# Patient Record
Sex: MALE | Race: WHITE | Employment: OTHER | URBAN - NONMETROPOLITAN AREA
[De-identification: names, ages, dates, MRNs, and addresses within clinical notes are randomized per-mention and may not be internally consistent; named-entity substitution may affect disease eponyms.]

---

## 2017-11-22 ENCOUNTER — HOSPITAL ENCOUNTER (INPATIENT)
Age: 72
LOS: 2 days | Discharge: HOME | DRG: 439 | End: 2017-11-24
Attending: HOSPITALIST | Admitting: INTERNAL MEDICINE
Payer: COMMERCIAL

## 2017-11-22 PROBLEM — F17.200 SMOKER: Status: ACTIVE | Noted: 2017-11-22

## 2017-11-22 PROBLEM — I73.9 PVD (PERIPHERAL VASCULAR DISEASE) (HCC): Status: ACTIVE | Noted: 2017-11-22

## 2017-11-22 PROBLEM — I71.40 ABDOMINAL AORTIC ANEURYSM (AAA): Status: ACTIVE | Noted: 2017-11-22

## 2017-11-22 PROBLEM — K85.90 PANCREATITIS: Status: ACTIVE | Noted: 2017-11-22

## 2017-11-22 PROBLEM — I82.890 SPLENIC VEIN THROMBOSIS: Status: ACTIVE | Noted: 2017-11-22

## 2017-11-22 LAB
ALBUMIN SERPL-MCNC: 2.7 G/DL (ref 3.5–5.2)
ALP BLD-CCNC: 96 U/L (ref 40–130)
ALT SERPL-CCNC: 10 U/L (ref 5–41)
AMYLASE: 138 U/L (ref 28–100)
ANION GAP SERPL CALCULATED.3IONS-SCNC: 8 MMOL/L (ref 7–19)
AST SERPL-CCNC: 18 U/L (ref 5–40)
BASOPHILS ABSOLUTE: 0.1 K/UL (ref 0–0.2)
BASOPHILS RELATIVE PERCENT: 0.6 % (ref 0–1)
BILIRUB SERPL-MCNC: 0.9 MG/DL (ref 0.2–1.2)
BUN BLDV-MCNC: 8 MG/DL (ref 8–23)
CALCIUM SERPL-MCNC: 8.4 MG/DL (ref 8.8–10.2)
CHLORIDE BLD-SCNC: 99 MMOL/L (ref 98–111)
CO2: 29 MMOL/L (ref 22–29)
CREAT SERPL-MCNC: 0.6 MG/DL (ref 0.5–1.2)
EOSINOPHILS ABSOLUTE: 0.7 K/UL (ref 0–0.6)
EOSINOPHILS RELATIVE PERCENT: 8.2 % (ref 0–5)
GFR NON-AFRICAN AMERICAN: >60
GLUCOSE BLD-MCNC: 123 MG/DL (ref 74–109)
HCT VFR BLD CALC: 39.2 % (ref 42–52)
HEMOGLOBIN: 13.5 G/DL (ref 14–18)
LIPASE: 178 U/L (ref 13–60)
LYMPHOCYTES ABSOLUTE: 1.1 K/UL (ref 1.1–4.5)
LYMPHOCYTES RELATIVE PERCENT: 13.5 % (ref 20–40)
MCH RBC QN AUTO: 31.5 PG (ref 27–31)
MCHC RBC AUTO-ENTMCNC: 34.4 G/DL (ref 33–37)
MCV RBC AUTO: 91.6 FL (ref 80–94)
MONOCYTES ABSOLUTE: 0.8 K/UL (ref 0–0.9)
MONOCYTES RELATIVE PERCENT: 9.3 % (ref 0–10)
NEUTROPHILS ABSOLUTE: 5.6 K/UL (ref 1.5–7.5)
NEUTROPHILS RELATIVE PERCENT: 68 % (ref 50–65)
PDW BLD-RTO: 15.7 % (ref 11.5–14.5)
PLATELET # BLD: 203 K/UL (ref 130–400)
PMV BLD AUTO: 9.6 FL (ref 9.4–12.4)
POTASSIUM SERPL-SCNC: 3.2 MMOL/L (ref 3.5–5)
PRO-BNP: 265 PG/ML (ref 0–900)
RBC # BLD: 4.28 M/UL (ref 4.7–6.1)
SODIUM BLD-SCNC: 136 MMOL/L (ref 136–145)
TOTAL PROTEIN: 5.8 G/DL (ref 6.6–8.7)
WBC # BLD: 8.2 K/UL (ref 4.8–10.8)

## 2017-11-22 PROCEDURE — 88305 TISSUE EXAM BY PATHOLOGIST: CPT

## 2017-11-22 PROCEDURE — 85025 COMPLETE CBC W/AUTO DIFF WBC: CPT

## 2017-11-22 PROCEDURE — 6360000002 HC RX W HCPCS: Performed by: INTERNAL MEDICINE

## 2017-11-22 PROCEDURE — 88173 CYTOPATH EVAL FNA REPORT: CPT

## 2017-11-22 PROCEDURE — 2580000003 HC RX 258: Performed by: HOSPITALIST

## 2017-11-22 PROCEDURE — 36415 COLL VENOUS BLD VENIPUNCTURE: CPT

## 2017-11-22 PROCEDURE — 83690 ASSAY OF LIPASE: CPT

## 2017-11-22 PROCEDURE — 6370000000 HC RX 637 (ALT 250 FOR IP): Performed by: INTERNAL MEDICINE

## 2017-11-22 PROCEDURE — 2700000000 HC OXYGEN THERAPY PER DAY

## 2017-11-22 PROCEDURE — 2580000003 HC RX 258: Performed by: INTERNAL MEDICINE

## 2017-11-22 PROCEDURE — 83880 ASSAY OF NATRIURETIC PEPTIDE: CPT

## 2017-11-22 PROCEDURE — 1210000000 HC MED SURG R&B

## 2017-11-22 PROCEDURE — 82150 ASSAY OF AMYLASE: CPT

## 2017-11-22 PROCEDURE — 99223 1ST HOSP IP/OBS HIGH 75: CPT | Performed by: INTERNAL MEDICINE

## 2017-11-22 PROCEDURE — 80053 COMPREHEN METABOLIC PANEL: CPT

## 2017-11-22 RX ORDER — METOPROLOL SUCCINATE 50 MG/1
50 TABLET, EXTENDED RELEASE ORAL DAILY
COMMUNITY

## 2017-11-22 RX ORDER — NICOTINE 21 MG/24HR
1 PATCH, TRANSDERMAL 24 HOURS TRANSDERMAL DAILY
Status: DISCONTINUED | OUTPATIENT
Start: 2017-11-23 | End: 2017-11-24 | Stop reason: HOSPADM

## 2017-11-22 RX ORDER — AMLODIPINE BESYLATE 10 MG/1
10 TABLET ORAL DAILY
Status: ON HOLD | COMMUNITY
End: 2017-11-24 | Stop reason: HOSPADM

## 2017-11-22 RX ORDER — CLOPIDOGREL BISULFATE 75 MG/1
75 TABLET ORAL DAILY
Status: DISCONTINUED | OUTPATIENT
Start: 2017-11-22 | End: 2017-11-24 | Stop reason: HOSPADM

## 2017-11-22 RX ORDER — MELOXICAM 15 MG/1
15 TABLET ORAL DAILY
COMMUNITY

## 2017-11-22 RX ORDER — SODIUM CHLORIDE 0.9 % (FLUSH) 0.9 %
10 SYRINGE (ML) INJECTION PRN
Status: DISCONTINUED | OUTPATIENT
Start: 2017-11-22 | End: 2017-11-24 | Stop reason: HOSPADM

## 2017-11-22 RX ORDER — PANTOPRAZOLE SODIUM 40 MG/1
40 TABLET, DELAYED RELEASE ORAL DAILY
COMMUNITY

## 2017-11-22 RX ORDER — ONDANSETRON 2 MG/ML
4 INJECTION INTRAMUSCULAR; INTRAVENOUS EVERY 6 HOURS PRN
Status: DISCONTINUED | OUTPATIENT
Start: 2017-11-22 | End: 2017-11-24 | Stop reason: HOSPADM

## 2017-11-22 RX ORDER — SODIUM CHLORIDE 0.9 % (FLUSH) 0.9 %
10 SYRINGE (ML) INJECTION EVERY 12 HOURS SCHEDULED
Status: DISCONTINUED | OUTPATIENT
Start: 2017-11-22 | End: 2017-11-24 | Stop reason: HOSPADM

## 2017-11-22 RX ORDER — LISINOPRIL 40 MG/1
40 TABLET ORAL DAILY
Status: ON HOLD | COMMUNITY
End: 2017-11-24 | Stop reason: HOSPADM

## 2017-11-22 RX ORDER — CLOPIDOGREL BISULFATE 75 MG/1
75 TABLET ORAL DAILY
COMMUNITY

## 2017-11-22 RX ORDER — FENTANYL CITRATE 50 UG/ML
25 INJECTION, SOLUTION INTRAMUSCULAR; INTRAVENOUS EVERY 4 HOURS PRN
Status: DISCONTINUED | OUTPATIENT
Start: 2017-11-22 | End: 2017-11-24 | Stop reason: HOSPADM

## 2017-11-22 RX ORDER — ACETAMINOPHEN 325 MG/1
650 TABLET ORAL EVERY 4 HOURS PRN
Status: DISCONTINUED | OUTPATIENT
Start: 2017-11-22 | End: 2017-11-24 | Stop reason: HOSPADM

## 2017-11-22 RX ORDER — POTASSIUM CHLORIDE 7.45 MG/ML
10 INJECTION INTRAVENOUS
Status: COMPLETED | OUTPATIENT
Start: 2017-11-22 | End: 2017-11-23

## 2017-11-22 RX ORDER — POTASSIUM CHLORIDE 750 MG/1
10 TABLET, FILM COATED, EXTENDED RELEASE ORAL DAILY
COMMUNITY

## 2017-11-22 RX ORDER — BISACODYL 10 MG
10 SUPPOSITORY, RECTAL RECTAL DAILY PRN
Status: DISCONTINUED | OUTPATIENT
Start: 2017-11-22 | End: 2017-11-24 | Stop reason: HOSPADM

## 2017-11-22 RX ORDER — TAMSULOSIN HYDROCHLORIDE 0.4 MG/1
0.4 CAPSULE ORAL 2 TIMES DAILY
COMMUNITY

## 2017-11-22 RX ORDER — LORAZEPAM 1 MG/1
1 TABLET ORAL 3 TIMES DAILY PRN
Status: ON HOLD | COMMUNITY
End: 2017-12-08 | Stop reason: ALTCHOICE

## 2017-11-22 RX ORDER — LORAZEPAM 1 MG/1
1 TABLET ORAL 3 TIMES DAILY PRN
Status: DISCONTINUED | OUTPATIENT
Start: 2017-11-22 | End: 2017-11-24 | Stop reason: HOSPADM

## 2017-11-22 RX ORDER — TAMSULOSIN HYDROCHLORIDE 0.4 MG/1
0.4 CAPSULE ORAL 2 TIMES DAILY
Status: DISCONTINUED | OUTPATIENT
Start: 2017-11-22 | End: 2017-11-24 | Stop reason: HOSPADM

## 2017-11-22 RX ORDER — SODIUM CHLORIDE 9 MG/ML
INJECTION, SOLUTION INTRAVENOUS CONTINUOUS
Status: DISCONTINUED | OUTPATIENT
Start: 2017-11-22 | End: 2017-11-24

## 2017-11-22 RX ORDER — DOCUSATE SODIUM 100 MG/1
100 CAPSULE, LIQUID FILLED ORAL 2 TIMES DAILY
Status: DISCONTINUED | OUTPATIENT
Start: 2017-11-22 | End: 2017-11-24 | Stop reason: HOSPADM

## 2017-11-22 RX ADMIN — POTASSIUM CHLORIDE 10 MEQ: 10 INJECTION, SOLUTION INTRAVENOUS at 21:35

## 2017-11-22 RX ADMIN — TAMSULOSIN HYDROCHLORIDE 0.4 MG: 0.4 CAPSULE ORAL at 21:35

## 2017-11-22 RX ADMIN — DOCUSATE SODIUM 100 MG: 100 CAPSULE, LIQUID FILLED ORAL at 21:35

## 2017-11-22 RX ADMIN — SODIUM CHLORIDE: 9 INJECTION, SOLUTION INTRAVENOUS at 21:35

## 2017-11-22 RX ADMIN — FENTANYL CITRATE 25 MCG: 50 INJECTION, SOLUTION INTRAMUSCULAR; INTRAVENOUS at 21:35

## 2017-11-22 RX ADMIN — Medication 10 ML: at 21:36

## 2017-11-22 RX ADMIN — LORAZEPAM 1 MG: 1 TABLET ORAL at 21:35

## 2017-11-22 ASSESSMENT — ENCOUNTER SYMPTOMS
BLURRED VISION: 0
COUGH: 0
DOUBLE VISION: 0
SPUTUM PRODUCTION: 0
PHOTOPHOBIA: 0
CONSTIPATION: 0
DIARRHEA: 0
ABDOMINAL PAIN: 1
HEMOPTYSIS: 0
BACK PAIN: 1

## 2017-11-22 ASSESSMENT — PAIN SCALES - GENERAL
PAINLEVEL_OUTOF10: 2
PAINLEVEL_OUTOF10: 5

## 2017-11-23 LAB
AMYLASE: 246 U/L (ref 28–100)
ANION GAP SERPL CALCULATED.3IONS-SCNC: 10 MMOL/L (ref 7–19)
APTT: 34.9 SEC (ref 26–36.2)
BASOPHILS ABSOLUTE: 0.1 K/UL (ref 0–0.2)
BASOPHILS RELATIVE PERCENT: 0.9 % (ref 0–1)
BILIRUBIN URINE: NEGATIVE
BLOOD, URINE: NEGATIVE
BUN BLDV-MCNC: 9 MG/DL (ref 8–23)
CALCIUM SERPL-MCNC: 8 MG/DL (ref 8.8–10.2)
CHLORIDE BLD-SCNC: 101 MMOL/L (ref 98–111)
CLARITY: CLEAR
CO2: 28 MMOL/L (ref 22–29)
COLOR: ABNORMAL
CREAT SERPL-MCNC: 0.7 MG/DL (ref 0.5–1.2)
EOSINOPHILS ABSOLUTE: 0.7 K/UL (ref 0–0.6)
EOSINOPHILS RELATIVE PERCENT: 8.2 % (ref 0–5)
GFR NON-AFRICAN AMERICAN: >60
GLUCOSE BLD-MCNC: 91 MG/DL (ref 74–109)
GLUCOSE URINE: NEGATIVE MG/DL
HCT VFR BLD CALC: 35.8 % (ref 42–52)
HEMOGLOBIN: 12.1 G/DL (ref 14–18)
INR BLD: 1.2 (ref 0.88–1.18)
KETONES, URINE: NEGATIVE MG/DL
LEUKOCYTE ESTERASE, URINE: NEGATIVE
LIPASE: 949 U/L (ref 13–60)
LYMPHOCYTES ABSOLUTE: 1.3 K/UL (ref 1.1–4.5)
LYMPHOCYTES RELATIVE PERCENT: 14.9 % (ref 20–40)
MCH RBC QN AUTO: 31.1 PG (ref 27–31)
MCHC RBC AUTO-ENTMCNC: 33.8 G/DL (ref 33–37)
MCV RBC AUTO: 92 FL (ref 80–94)
MONOCYTES ABSOLUTE: 1.2 K/UL (ref 0–0.9)
MONOCYTES RELATIVE PERCENT: 13.4 % (ref 0–10)
NEUTROPHILS ABSOLUTE: 5.6 K/UL (ref 1.5–7.5)
NEUTROPHILS RELATIVE PERCENT: 62.2 % (ref 50–65)
NITRITE, URINE: NEGATIVE
PDW BLD-RTO: 15.9 % (ref 11.5–14.5)
PH UA: 7.5
PLATELET # BLD: 213 K/UL (ref 130–400)
PMV BLD AUTO: 9.9 FL (ref 9.4–12.4)
POTASSIUM SERPL-SCNC: 3.6 MMOL/L (ref 3.5–5)
PROTEIN UA: NEGATIVE MG/DL
PROTHROMBIN TIME: 15.2 SEC (ref 12–14.6)
RBC # BLD: 3.89 M/UL (ref 4.7–6.1)
SODIUM BLD-SCNC: 139 MMOL/L (ref 136–145)
SPECIFIC GRAVITY UA: 1.02
UROBILINOGEN, URINE: 4 E.U./DL
WBC # BLD: 9 K/UL (ref 4.8–10.8)

## 2017-11-23 PROCEDURE — 80048 BASIC METABOLIC PNL TOTAL CA: CPT

## 2017-11-23 PROCEDURE — 36415 COLL VENOUS BLD VENIPUNCTURE: CPT

## 2017-11-23 PROCEDURE — 85025 COMPLETE CBC W/AUTO DIFF WBC: CPT

## 2017-11-23 PROCEDURE — 2580000003 HC RX 258: Performed by: INTERNAL MEDICINE

## 2017-11-23 PROCEDURE — 6370000000 HC RX 637 (ALT 250 FOR IP): Performed by: INTERNAL MEDICINE

## 2017-11-23 PROCEDURE — 82150 ASSAY OF AMYLASE: CPT

## 2017-11-23 PROCEDURE — 6360000002 HC RX W HCPCS: Performed by: INTERNAL MEDICINE

## 2017-11-23 PROCEDURE — 1210000000 HC MED SURG R&B

## 2017-11-23 PROCEDURE — 85610 PROTHROMBIN TIME: CPT

## 2017-11-23 PROCEDURE — 6370000000 HC RX 637 (ALT 250 FOR IP): Performed by: HOSPITALIST

## 2017-11-23 PROCEDURE — 85730 THROMBOPLASTIN TIME PARTIAL: CPT

## 2017-11-23 PROCEDURE — 83690 ASSAY OF LIPASE: CPT

## 2017-11-23 PROCEDURE — 99222 1ST HOSP IP/OBS MODERATE 55: CPT | Performed by: INTERNAL MEDICINE

## 2017-11-23 PROCEDURE — 99232 SBSQ HOSP IP/OBS MODERATE 35: CPT | Performed by: HOSPITALIST

## 2017-11-23 PROCEDURE — 2500000003 HC RX 250 WO HCPCS: Performed by: INTERNAL MEDICINE

## 2017-11-23 PROCEDURE — 81003 URINALYSIS AUTO W/O SCOPE: CPT

## 2017-11-23 PROCEDURE — C9113 INJ PANTOPRAZOLE SODIUM, VIA: HCPCS | Performed by: INTERNAL MEDICINE

## 2017-11-23 RX ORDER — LORAZEPAM 1 MG/1
3 TABLET ORAL
Status: DISCONTINUED | OUTPATIENT
Start: 2017-11-23 | End: 2017-11-24 | Stop reason: HOSPADM

## 2017-11-23 RX ORDER — LORAZEPAM 1 MG/1
1 TABLET ORAL
Status: DISCONTINUED | OUTPATIENT
Start: 2017-11-23 | End: 2017-11-24 | Stop reason: HOSPADM

## 2017-11-23 RX ORDER — CHLORDIAZEPOXIDE HYDROCHLORIDE 10 MG/1
10 CAPSULE, GELATIN COATED ORAL 3 TIMES DAILY
Status: DISCONTINUED | OUTPATIENT
Start: 2017-11-23 | End: 2017-11-24 | Stop reason: HOSPADM

## 2017-11-23 RX ORDER — LORAZEPAM 1 MG/1
2 TABLET ORAL
Status: DISCONTINUED | OUTPATIENT
Start: 2017-11-23 | End: 2017-11-24 | Stop reason: HOSPADM

## 2017-11-23 RX ORDER — LORAZEPAM 2 MG/ML
1 INJECTION INTRAMUSCULAR
Status: DISCONTINUED | OUTPATIENT
Start: 2017-11-23 | End: 2017-11-24 | Stop reason: HOSPADM

## 2017-11-23 RX ORDER — SODIUM CHLORIDE 0.9 % (FLUSH) 0.9 %
10 SYRINGE (ML) INJECTION EVERY 12 HOURS SCHEDULED
Status: DISCONTINUED | OUTPATIENT
Start: 2017-11-23 | End: 2017-11-24 | Stop reason: HOSPADM

## 2017-11-23 RX ORDER — LORAZEPAM 2 MG/ML
2 INJECTION INTRAMUSCULAR
Status: DISCONTINUED | OUTPATIENT
Start: 2017-11-23 | End: 2017-11-24 | Stop reason: HOSPADM

## 2017-11-23 RX ORDER — LORAZEPAM 2 MG/ML
3 INJECTION INTRAMUSCULAR
Status: DISCONTINUED | OUTPATIENT
Start: 2017-11-23 | End: 2017-11-24 | Stop reason: HOSPADM

## 2017-11-23 RX ORDER — LORAZEPAM 1 MG/1
4 TABLET ORAL
Status: DISCONTINUED | OUTPATIENT
Start: 2017-11-23 | End: 2017-11-24 | Stop reason: HOSPADM

## 2017-11-23 RX ORDER — PANTOPRAZOLE SODIUM 40 MG/10ML
40 INJECTION, POWDER, LYOPHILIZED, FOR SOLUTION INTRAVENOUS DAILY
Status: DISCONTINUED | OUTPATIENT
Start: 2017-11-23 | End: 2017-11-24 | Stop reason: HOSPADM

## 2017-11-23 RX ORDER — SODIUM CHLORIDE 0.9 % (FLUSH) 0.9 %
10 SYRINGE (ML) INJECTION PRN
Status: DISCONTINUED | OUTPATIENT
Start: 2017-11-23 | End: 2017-11-24 | Stop reason: HOSPADM

## 2017-11-23 RX ORDER — LORAZEPAM 2 MG/ML
4 INJECTION INTRAMUSCULAR
Status: DISCONTINUED | OUTPATIENT
Start: 2017-11-23 | End: 2017-11-24 | Stop reason: HOSPADM

## 2017-11-23 RX ADMIN — Medication 10 ML: at 07:43

## 2017-11-23 RX ADMIN — Medication 10 ML: at 20:00

## 2017-11-23 RX ADMIN — ONDANSETRON 4 MG: 2 INJECTION INTRAMUSCULAR; INTRAVENOUS at 08:05

## 2017-11-23 RX ADMIN — ENOXAPARIN SODIUM 40 MG: 40 INJECTION SUBCUTANEOUS at 20:00

## 2017-11-23 RX ADMIN — FENTANYL CITRATE 25 MCG: 50 INJECTION, SOLUTION INTRAMUSCULAR; INTRAVENOUS at 21:45

## 2017-11-23 RX ADMIN — FENTANYL CITRATE 25 MCG: 50 INJECTION, SOLUTION INTRAMUSCULAR; INTRAVENOUS at 12:19

## 2017-11-23 RX ADMIN — CHLORDIAZEPOXIDE HYDROCHLORIDE 10 MG: 10 CAPSULE ORAL at 15:57

## 2017-11-23 RX ADMIN — TAMSULOSIN HYDROCHLORIDE 0.4 MG: 0.4 CAPSULE ORAL at 20:00

## 2017-11-23 RX ADMIN — CHLORDIAZEPOXIDE HYDROCHLORIDE 10 MG: 10 CAPSULE ORAL at 20:00

## 2017-11-23 RX ADMIN — TAMSULOSIN HYDROCHLORIDE 0.4 MG: 0.4 CAPSULE ORAL at 07:43

## 2017-11-23 RX ADMIN — FENTANYL CITRATE 25 MCG: 50 INJECTION, SOLUTION INTRAMUSCULAR; INTRAVENOUS at 08:05

## 2017-11-23 RX ADMIN — PANTOPRAZOLE SODIUM 40 MG: 40 INJECTION, POWDER, FOR SOLUTION INTRAVENOUS at 07:43

## 2017-11-23 RX ADMIN — DOCUSATE SODIUM 100 MG: 100 CAPSULE, LIQUID FILLED ORAL at 07:43

## 2017-11-23 RX ADMIN — POTASSIUM CHLORIDE 10 MEQ: 10 INJECTION, SOLUTION INTRAVENOUS at 00:35

## 2017-11-23 RX ADMIN — FENTANYL CITRATE 25 MCG: 50 INJECTION, SOLUTION INTRAMUSCULAR; INTRAVENOUS at 17:18

## 2017-11-23 RX ADMIN — DOCUSATE SODIUM 100 MG: 100 CAPSULE, LIQUID FILLED ORAL at 20:00

## 2017-11-23 RX ADMIN — FOLIC ACID: 5 INJECTION, SOLUTION INTRAMUSCULAR; INTRAVENOUS; SUBCUTANEOUS at 09:55

## 2017-11-23 ASSESSMENT — PAIN SCALES - GENERAL
PAINLEVEL_OUTOF10: 0
PAINLEVEL_OUTOF10: 6
PAINLEVEL_OUTOF10: 2
PAINLEVEL_OUTOF10: 3
PAINLEVEL_OUTOF10: 7
PAINLEVEL_OUTOF10: 6

## 2017-11-23 NOTE — H&P
Hospital Medicine    History & Physical        Reason for admission:  Transfer from [facility] for pancreatitis, pancreatic mass    History Obtained From:  patient, electronic medical record    HISTORY OF PRESENT ILLNESS:    The patient is a 67 y.o. male who was having epigastric discomfort he went to see his pcp, and labs were sent resulting in elevated amylase, and lipase. A ct was done and results were ? Mass, cbd, splenic vein thrombosis, stable aaa 3.4,thickening greater curvature of stomach . The case was discussed with Dr Governor Mora hospitalist and was accepted for transfer for possible ERCP and higher level of care. Past Medical History:        Diagnosis Date    Arthritis     Blood circulation, collateral     GERD (gastroesophageal reflux disease)     current epigastric pain     Hypertension        Past Surgical History:        Procedure Laterality Date    BACK SURGERY N/A        Medications Prior to Admission:    Prescriptions Prior to Admission: meloxicam (MOBIC) 15 MG tablet, Take 15 mg by mouth daily  tamsulosin (FLOMAX) 0.4 MG capsule, Take 0.4 mg by mouth 2 times daily  pantoprazole (PROTONIX) 40 MG tablet, Take 40 mg by mouth daily  LORazepam (ATIVAN) 1 MG tablet, Take 1 mg by mouth 3 times daily as needed for Anxiety . metoprolol succinate (TOPROL XL) 50 MG extended release tablet, Take 50 mg by mouth daily  potassium chloride (KLOR-CON) 10 MEQ extended release tablet, Take 10 mEq by mouth daily  lisinopril (PRINIVIL;ZESTRIL) 40 MG tablet, Take 40 mg by mouth daily  amLODIPine (NORVASC) 10 MG tablet, Take 10 mg by mouth daily  clopidogrel (PLAVIX) 75 MG tablet, Take 75 mg by mouth daily  aspirin 81 MG tablet, Take 81 mg by mouth daily    Allergies:  Review of patient's allergies indicates no known allergies. Social History:   TOBACCO:   has no tobacco history on file. ETOH:   has no alcohol history on file.   Patient currently lives with family     Family History:       Problem Relation Age of Onset    Heart Attack Mother     Heart Disease Mother     Cancer Brother        I have personally reviewed above histories  REVIEW OF SYSTEMS:  Review of Systems   Constitutional: Negative for chills, fever and weight loss. HENT: Negative. Negative for ear pain, hearing loss and tinnitus. Eyes: Negative for blurred vision, double vision and photophobia. Respiratory: Negative for cough, hemoptysis and sputum production. Cardiovascular: Negative. Gastrointestinal: Positive for abdominal pain. Negative for constipation and diarrhea. Genitourinary: Negative for dysuria, frequency and urgency. Musculoskeletal: Positive for back pain and joint pain. Negative for falls and myalgias. Skin: Negative. Neurological: Negative for dizziness, tingling, tremors, weakness and headaches. Endo/Heme/Allergies: Negative for environmental allergies. Psychiatric/Behavioral: Negative for depression, substance abuse and suicidal ideas. The patient has insomnia. PHYSICAL EXAM:  /63   Pulse 97   Temp 97.5 °F (36.4 °C) (Temporal)   Resp 18   SpO2 92%   Physical Exam   Constitutional: He is oriented to person, place, and time. He appears well-developed and well-nourished. Non-toxic appearance. He does not have a sickly appearance. He does not appear ill. No distress. HENT:   Head: Normocephalic and atraumatic. Eyes: Lids are normal. Pupils are equal, round, and reactive to light. Neck: Trachea normal. No JVD present. Carotid bruit is not present. Cardiovascular: Normal rate and regular rhythm. Pulses:       Carotid pulses are 2+ on the right side, and 2+ on the left side. Radial pulses are 2+ on the right side, and 2+ on the left side. Pulmonary/Chest: He has no decreased breath sounds. He has no wheezes. He has no rhonchi. He has no rales. Abdominal: Soft.  Normal appearance and bowel sounds are normal.   Musculoskeletal:        Right lower leg: He exhibits no swelling and no edema. Left lower leg: He exhibits no swelling and no edema. Legs:  Neurological: He is alert and oriented to person, place, and time. Skin: Skin is warm and dry. Psychiatric: He has a normal mood and affect. His speech is normal.     DATA:  EKG:  I have reviewed EKG with the following interpretation:  Imaging:    No orders to display              Labs Reviewed   COMPREHENSIVE METABOLIC PANEL - Abnormal; Notable for the following:        Result Value    Potassium 3.2 (*)     Glucose 123 (*)     Calcium 8.4 (*)     Total Protein 5.8 (*)     Alb 2.7 (*)     All other components within normal limits   CBC WITH AUTO DIFFERENTIAL - Abnormal; Notable for the following:     RBC 4.28 (*)     Hemoglobin 13.5 (*)     Hematocrit 39.2 (*)     MCH 31.5 (*)     RDW 15.7 (*)     Neutrophils % 68.0 (*)     Lymphocytes % 13.5 (*)     Eosinophils % 8.2 (*)     Eosinophils # 0.70 (*)     All other components within normal limits   LIPASE - Abnormal; Notable for the following:     Lipase 178 (*)     All other components within normal limits   AMYLASE - Abnormal; Notable for the following:     Amylase 138 (*)     All other components within normal limits   BRAIN NATRIURETIC PEPTIDE   URINALYSIS          IMPRESSION:    1. Pancreatitis  2. ? Pancreatic mass  3. etoh use  4. PVD stable AAA  5. Splenic vein thrombosis  6. Smoker  7. CAD stable      PLAN:     1. Admit to floor  2. Consult GI  3. Will find out about ercp status in view family was not well informed of the plan before transferring. 4. Clear liquid  5. CIWA protocol  6. Resume home medications  7. Will let gi address splenic vein thrombosis in view of etoh history AC will be case to case, in case there is possibility of esophageal varices. 8. Advised quit smoking and drinking  9. dvt prophylaxis  10. ciwa protocol  11. Nicotine patch.     Jaime Arshad MD    Internal Medicine Hospitalist

## 2017-11-24 ENCOUNTER — APPOINTMENT (OUTPATIENT)
Dept: MRI IMAGING | Age: 72
DRG: 439 | End: 2017-11-24
Attending: INTERNAL MEDICINE
Payer: COMMERCIAL

## 2017-11-24 VITALS
WEIGHT: 161.7 LBS | HEART RATE: 86 BPM | DIASTOLIC BLOOD PRESSURE: 63 MMHG | TEMPERATURE: 97.9 F | SYSTOLIC BLOOD PRESSURE: 116 MMHG | OXYGEN SATURATION: 92 % | RESPIRATION RATE: 16 BRPM

## 2017-11-24 PROBLEM — K85.90 PANCREATITIS, UNSPECIFIED PANCREATITIS TYPE: Status: ACTIVE | Noted: 2017-11-24

## 2017-11-24 LAB
ALBUMIN SERPL-MCNC: 2.5 G/DL (ref 3.5–5.2)
ALP BLD-CCNC: 84 U/L (ref 40–130)
ALT SERPL-CCNC: 11 U/L (ref 5–41)
AMYLASE: 169 U/L (ref 28–100)
ANION GAP SERPL CALCULATED.3IONS-SCNC: 8 MMOL/L (ref 7–19)
AST SERPL-CCNC: 19 U/L (ref 5–40)
BILIRUB SERPL-MCNC: 0.7 MG/DL (ref 0.2–1.2)
BUN BLDV-MCNC: 5 MG/DL (ref 8–23)
CA 19-9: 21 U/ML (ref 0–35)
CALCIUM SERPL-MCNC: 7.9 MG/DL (ref 8.8–10.2)
CEA: 5 NG/ML (ref 0–4.7)
CHLORIDE BLD-SCNC: 101 MMOL/L (ref 98–111)
CO2: 28 MMOL/L (ref 22–29)
CREAT SERPL-MCNC: 0.6 MG/DL (ref 0.5–1.2)
GFR NON-AFRICAN AMERICAN: >60
GLUCOSE BLD-MCNC: 93 MG/DL (ref 74–109)
LIPASE: 545 U/L (ref 13–60)
POTASSIUM SERPL-SCNC: 3.9 MMOL/L (ref 3.5–5)
PRO-BNP: 401 PG/ML (ref 0–900)
SODIUM BLD-SCNC: 137 MMOL/L (ref 136–145)
TOTAL PROTEIN: 4.8 G/DL (ref 6.6–8.7)

## 2017-11-24 PROCEDURE — 2580000003 HC RX 258: Performed by: INTERNAL MEDICINE

## 2017-11-24 PROCEDURE — 82378 CARCINOEMBRYONIC ANTIGEN: CPT

## 2017-11-24 PROCEDURE — 82150 ASSAY OF AMYLASE: CPT

## 2017-11-24 PROCEDURE — 74181 MRI ABDOMEN W/O CONTRAST: CPT

## 2017-11-24 PROCEDURE — 94761 N-INVAS EAR/PLS OXIMETRY MLT: CPT

## 2017-11-24 PROCEDURE — 36415 COLL VENOUS BLD VENIPUNCTURE: CPT

## 2017-11-24 PROCEDURE — C9113 INJ PANTOPRAZOLE SODIUM, VIA: HCPCS | Performed by: INTERNAL MEDICINE

## 2017-11-24 PROCEDURE — 2500000003 HC RX 250 WO HCPCS: Performed by: INTERNAL MEDICINE

## 2017-11-24 PROCEDURE — 86301 IMMUNOASSAY TUMOR CA 19-9: CPT

## 2017-11-24 PROCEDURE — 83880 ASSAY OF NATRIURETIC PEPTIDE: CPT

## 2017-11-24 PROCEDURE — 99239 HOSP IP/OBS DSCHRG MGMT >30: CPT | Performed by: HOSPITALIST

## 2017-11-24 PROCEDURE — 83690 ASSAY OF LIPASE: CPT

## 2017-11-24 PROCEDURE — 6360000002 HC RX W HCPCS: Performed by: INTERNAL MEDICINE

## 2017-11-24 PROCEDURE — 6370000000 HC RX 637 (ALT 250 FOR IP): Performed by: INTERNAL MEDICINE

## 2017-11-24 PROCEDURE — 80053 COMPREHEN METABOLIC PANEL: CPT

## 2017-11-24 PROCEDURE — 6370000000 HC RX 637 (ALT 250 FOR IP): Performed by: HOSPITALIST

## 2017-11-24 RX ORDER — NICOTINE 21 MG/24HR
1 PATCH, TRANSDERMAL 24 HOURS TRANSDERMAL DAILY
Qty: 30 PATCH | Refills: 0 | Status: ON HOLD | OUTPATIENT
Start: 2017-11-25 | End: 2017-12-08

## 2017-11-24 RX ADMIN — CLOPIDOGREL BISULFATE 75 MG: 75 TABLET ORAL at 11:56

## 2017-11-24 RX ADMIN — FENTANYL CITRATE 25 MCG: 50 INJECTION, SOLUTION INTRAMUSCULAR; INTRAVENOUS at 11:53

## 2017-11-24 RX ADMIN — Medication 10 ML: at 09:15

## 2017-11-24 RX ADMIN — TAMSULOSIN HYDROCHLORIDE 0.4 MG: 0.4 CAPSULE ORAL at 11:55

## 2017-11-24 RX ADMIN — LORAZEPAM 1 MG: 2 INJECTION INTRAMUSCULAR; INTRAVENOUS at 09:50

## 2017-11-24 RX ADMIN — PANTOPRAZOLE SODIUM 40 MG: 40 INJECTION, POWDER, FOR SOLUTION INTRAVENOUS at 09:14

## 2017-11-24 RX ADMIN — DOCUSATE SODIUM 100 MG: 100 CAPSULE, LIQUID FILLED ORAL at 11:55

## 2017-11-24 RX ADMIN — FOLIC ACID: 5 INJECTION, SOLUTION INTRAMUSCULAR; INTRAVENOUS; SUBCUTANEOUS at 09:14

## 2017-11-24 RX ADMIN — FENTANYL CITRATE 25 MCG: 50 INJECTION, SOLUTION INTRAMUSCULAR; INTRAVENOUS at 07:15

## 2017-11-24 RX ADMIN — CHLORDIAZEPOXIDE HYDROCHLORIDE 10 MG: 10 CAPSULE ORAL at 14:36

## 2017-11-24 RX ADMIN — FENTANYL CITRATE 25 MCG: 50 INJECTION, SOLUTION INTRAMUSCULAR; INTRAVENOUS at 01:43

## 2017-11-24 ASSESSMENT — PAIN SCALES - GENERAL
PAINLEVEL_OUTOF10: 7
PAINLEVEL_OUTOF10: 6
PAINLEVEL_OUTOF10: 6

## 2017-11-24 NOTE — DISCHARGE SUMMARY
Physician Discharge Summary     Patient ID:  Memory Lennox  [id number]  39 y.o.  1945    Admit date: 11/22/2017    Discharge date and time: 11/24/2017     Admitting Physician: Cam Franklin MD     Discharge Physician: Radha Brewer MD    Admission Diagnoses: Pancreatitis, unspecified pancreatitis type [K85.90]    Discharge Diagnoses:     1.  Acute pancreatitis, possibly secondary to alcohol versus mass. 2.  Abnormal CT scan showing possible mass at the neck and body of the pancreas --follow up with Dr Abdoul Salinas OP  3. Splenic vein thrombosis- tx per GI  4. Hypokalemia- replaced        Discharged Condition: stable    Indication for Admission: pancreatitis     Hospital Course:     68 yo male transferred from [facility] for pancreatitis. He has had fairly normal liver enzymes since arriving here. His amylase and lipase have been elevated. He admited to some mild epigastric pain. He admited to drinking 6 ounces of _80 proff_ whisky daily and this has been longstanding habit for him. He had a CT scan at outside hospital showing possible pancreatic mass. He has been tolerating liquids. MRCP showed mass within the body of the pancreas measuring approximately 4.7 x 3.6 cm, predominantly cystic. Differential possibilities include acute pancreatitis, as well as pancreatic neoplasm (pancreatitis is favored). Follow-up CT is recommended in 4-6 weeks. He was educated on alcohol cessation. Lisinopril was stopped. He was advised to continue liquid diet for now and follow up with Dr Abdoul Salinas in 3 days. Consults: GI    Significant Diagnostic Studies:     MRI ABDOMEN WO CONTRAST MRCP [[id number]] Resulted: 11/24/17 1422      Order Status: Completed Updated: 11/24/17 1324     Narrative:       EXAMINATION: MRI ABDOMEN WO CONTRAST MRCP 11/24/2017 1:04 PM  HISTORY: Abdominal pain for 6 days, pancreatitis, possible pancreatic  mass seen on outside CT.   Report: Multiplanar multisequence MR imaging of the abdomen was  performed without contrast, comparison is made with a CT of the  abdomen and pelvis with contrast at [facility]  performed on 11/22/2017. There is moderate respiratory motion artifact. As seen on the outside  CT, there is a mixed signal, predominantly cystic mass within the body  of the pancreas that measures approximately 4.7 x 3.6 cm, with mild  dilation of the proximal pancreatic duct, 3.3 mm. The peripancreatic  fat planes are somewhat indistinct and fluid tracks along the inferior  stomach and along the greater wall. Mild dilation of the biliary ducts  within the left hepatic lobe as seen on CT. The outside CT shows  geographic area of decreased attenuation within the right hepatic  lobe, most likely related to fatty infiltration, though the in phase  and out of phase images are not very supportive of this finding. Diffusion-weighted images show increased signal in the region of the  pancreatic mass. MRCP images show mild dilation of the left hepatic  bile ducts with no obvious filling defects within the ducts or mass  effect at the common hepatic duct bifurcation. The gallbladder appears  unremarkable. There is no gross evidence of lymphadenopathy in the  upper abdomen and kidneys appear within normal limits. The adrenal  glands appear unremarkable. The spleen is normal in size.     Impression:       1. Limited study due to patient breathing motion artifact. Mixed  signal mass within the body of the pancreas measuring approximately  4.7 x 3.6 cm, predominantly cystic. Differential possibilities include  acute pancreatitis, as well as pancreatic neoplasm (pancreatitis is  favored). There is mild induration of fat planes around the pancreas  and extending along the inferior stomach to the greater curvature. There is mild dilation of the proximal pancreatic duct which measures  3.3 mm. Follow-up CT is recommended in 4-6 weeks.   2. Mild dilation of the left hepatic ducts compared to the right  without a discrete mass at the hepatic duct bifurcation and no filling  defects are seen within the bile ducts. Geographic fatty infiltration  of the right hepatic lobe is demonstrated. 3. No obvious lymphadenopathy seen in the upper abdomen. Signed by Dr Anne-Marie Odom on 11/24/2017 1:22 PM         Discharge Exam:  /63   Pulse 86   Temp 97.9 °F (36.6 °C) (Temporal)   Resp 18   Wt 161 lb 11.2 oz (73.3 kg)   SpO2 93%     General appearance: alert, appears stated age and cooperative  Skin: Skin color, texture, turgor normal.   HEENT: Head: Normocephalic, no lesions, without obvious abnormality. Neck: no adenopathy, no carotid bruit, no JVD and supple, symmetrical, trachea midline  Lungs: clear to auscultation bilaterally  Heart: regular rate and rhythm, S1, S2 normal, no murmur, click, rub or gallop  Abdomen: soft, non-tender; bowel sounds normal; no masses,  no organomegaly  Extremities: extremities normal, atraumatic, no cyanosis or edema  Lymphatic: No significant lymph node enlargement papable  Neurologic: Mental status: Alert, oriented, thought content appropriate      Discharge Medications:     [facility] Medication Instructions AAO:[id number]    Printed on:11/24/17 2487   Medication Information                      aspirin 81 MG tablet  Take 81 mg by mouth daily             clopidogrel (PLAVIX) 75 MG tablet  Take 75 mg by mouth daily             LORazepam (ATIVAN) 1 MG tablet  Take 1 mg by mouth 3 times daily as needed for Anxiety .              meloxicam (MOBIC) 15 MG tablet  Take 15 mg by mouth daily             metoprolol succinate (TOPROL XL) 50 MG extended release tablet  Take 50 mg by mouth daily             nicotine (NICODERM CQ) 21 MG/24HR  Place 1 patch onto the skin daily             pantoprazole (PROTONIX) 40 MG tablet  Take 40 mg by mouth daily             potassium chloride (KLOR-CON) 10 MEQ extended release tablet  Take 10 mEq by mouth daily             tamsulosin

## 2017-11-24 NOTE — CONSULTS
Consults     [id number]
 and CEA. I have  discussed with him the need to discontinue all alcohol use and adhere to low-fat  Diet. He acknowledges this. After his MRCP tomorrow, he may pursue low-fat  diet as tolerated. I would suggest that he follow with Dr. Lina Johnson as an  outpatient, and he will probably need EUS to further evaluate what is going  on the pancreas. Thank you for allowing me to participate in his care.     Sincerely,        Mahesh Phillips DO    D: 11/23/2017 22:55:22       T: 11/23/2017 22:57:13     [email or web address]  Job#: [id number]     Doc#: [id number]

## 2017-11-24 NOTE — PROGRESS NOTES
0710-Bedside report rec'd from off going nurse. Introduced self to pt, safety check completed. POC reviewed with pt, verbalized understanding. Pain medication given per request of patient. Denies any other needs at this time. Assumed care. 1025-Patient going down for MRC. Ativan given to relax pt. Very upset that procedure was not done at 0800. Very thankful for ativan and no longer upset at this time. Will continue to monitor. 1120-Pt back from [facility]. Low fat diet started. Pt resting in bed awaiting for results. Would like to go home today. Denies any other needs at this time. Will continue to monitor. 1515-Discharge paperwork completed with pt and wife. Verbalized understanding. All pt belongings with pt. Transport to take to main doors. Wife to take home.
14:30p - Home oxygen evaluation performed and results are as follows:  SaO2 =91% on R/A at rest, SaO2 = 94% on 2 lpm(NC) at rest, SaO2 = 88% on R/A while ambulating, SaO2 = 95% on 2 lpm(NC) while ambulating(recovery SaO2) TS RRT/RCP
General appearance: alert, appears stated age and cooperative  Skin: Skin color, texture, turgor normal.   HEENT: Head: Normocephalic, no lesions, without obvious abnormality. Neck: no adenopathy, no carotid bruit, no JVD and supple, symmetrical, trachea midline  Lungs: clear to auscultation bilaterally  Heart: regular rate and rhythm, S1, S2 normal, no murmur, click, rub or gallop  Abdomen: soft, non-tender; bowel sounds normal; no masses,  no organomegaly  Extremities: extremities normal, atraumatic, no cyanosis or edema  Lymphatic: No significant lymph node enlargement papable  Neurologic: Mental status: Alert, oriented, thought content appropriate        Assessment & Plan:    1. Acute pancreatitis, possibly secondary to alcohol versus mass. 2.  Abnormal CT scan showing possible mass at the neck and body of the pancreas, rule out pseudocyst.  3. Splenic vein thrombosis  4.  Hypokalemia- replace     Claudia Morelos

## 2017-11-24 NOTE — CARE COORDINATION
Status Primary Coverage   Paulo Rain [id number] Inpatient Open [place] [employer] HMO          Guarantor Account (for Hospital Account [de-identified])   Name Relation to Jonathan Arizmendi Dr?  Acct Type   Claven More [employer] [facility] Yes Personal/Family   Address Phone         [address], [address] [phone](N)            Coverage Information (for Hospital Account [de-identified])   F/O Payor/Plan Precert #   [facility] HMO    Subscriber Subscriber #   Paulo Rain [id number]   Address Phone   [address] [address]

## 2017-11-28 ENCOUNTER — TELEPHONE (OUTPATIENT)
Dept: GASTROENTEROLOGY | Age: 72
End: 2017-11-28

## 2017-11-28 NOTE — TELEPHONE ENCOUNTER
Patient is scheduled for an EUS for pancreatitis on  Friday 12/8/17. - went over all info with him in regards to medications. Let patient know he could resume regular diet of choice from now until the day of procedure (npo after midninght day of procedure) & refrain from alcohol from now until after the procedure.   He is on Plavix and daily aspirin I have sent a clearance to Dr. Norma Min his PCP (located at [facility] in [address] [place] [address]. [phone]  - F [phone])

## 2017-11-29 ENCOUNTER — TELEPHONE (OUTPATIENT)
Dept: GASTROENTEROLOGY | Age: 72
End: 2017-11-29

## 2017-11-29 NOTE — TELEPHONE ENCOUNTER
Received clearance from Dr Kimber Luna to hold Plavix and ASA prior to an EUS  OK to proceed.    Clearance is attached in media  CE

## 2017-12-08 ENCOUNTER — HOSPITAL ENCOUNTER (OUTPATIENT)
Age: 72
Setting detail: OUTPATIENT SURGERY
Discharge: HOME | End: 2017-12-08
Attending: INTERNAL MEDICINE | Admitting: INTERNAL MEDICINE
Payer: MEDICARE

## 2017-12-08 ENCOUNTER — ANESTHESIA (OUTPATIENT)
Dept: ENDOSCOPY | Age: 72
End: 2017-12-08
Payer: MEDICARE

## 2017-12-08 ENCOUNTER — ANESTHESIA EVENT (OUTPATIENT)
Dept: ENDOSCOPY | Age: 72
End: 2017-12-08
Payer: MEDICARE

## 2017-12-08 VITALS
RESPIRATION RATE: 18 BRPM | SYSTOLIC BLOOD PRESSURE: 107 MMHG | DIASTOLIC BLOOD PRESSURE: 56 MMHG | OXYGEN SATURATION: 95 %

## 2017-12-08 VITALS
RESPIRATION RATE: 19 BRPM | HEIGHT: 70 IN | TEMPERATURE: 98 F | BODY MASS INDEX: 23.56 KG/M2 | DIASTOLIC BLOOD PRESSURE: 55 MMHG | WEIGHT: 164.6 LBS | SYSTOLIC BLOOD PRESSURE: 109 MMHG | OXYGEN SATURATION: 100 % | HEART RATE: 69 BPM

## 2017-12-08 DIAGNOSIS — Z87.19 HISTORY OF PANCREATITIS: ICD-10-CM

## 2017-12-08 DIAGNOSIS — K86.2 PANCREAS CYST: Primary | ICD-10-CM

## 2017-12-08 LAB — CA 19-9: 11 U/ML (ref 0–35)

## 2017-12-08 PROCEDURE — 2720000001 HC MISC SURG SUPPLY STERILE $51-500: Performed by: INTERNAL MEDICINE

## 2017-12-08 PROCEDURE — 3609018700 HC ENDOSCOPIC ULTRASOUND: Performed by: INTERNAL MEDICINE

## 2017-12-08 PROCEDURE — 6360000002 HC RX W HCPCS: Performed by: NURSE ANESTHETIST, CERTIFIED REGISTERED

## 2017-12-08 PROCEDURE — 86301 IMMUNOASSAY TUMOR CA 19-9: CPT

## 2017-12-08 PROCEDURE — 36415 COLL VENOUS BLD VENIPUNCTURE: CPT

## 2017-12-08 PROCEDURE — 43242 EGD US FINE NEEDLE BX/ASPIR: CPT | Performed by: INTERNAL MEDICINE

## 2017-12-08 PROCEDURE — 82787 IGG 1 2 3 OR 4 EACH: CPT

## 2017-12-08 PROCEDURE — 2580000003 HC RX 258: Performed by: INTERNAL MEDICINE

## 2017-12-08 PROCEDURE — 2500000003 HC RX 250 WO HCPCS: Performed by: INTERNAL MEDICINE

## 2017-12-08 PROCEDURE — 7100000010 HC PHASE II RECOVERY - FIRST 15 MIN: Performed by: INTERNAL MEDICINE

## 2017-12-08 PROCEDURE — 3700000000 HC ANESTHESIA ATTENDED CARE: Performed by: INTERNAL MEDICINE

## 2017-12-08 PROCEDURE — 7100000011 HC PHASE II RECOVERY - ADDTL 15 MIN: Performed by: INTERNAL MEDICINE

## 2017-12-08 PROCEDURE — 3700000001 HC ADD 15 MINUTES (ANESTHESIA): Performed by: INTERNAL MEDICINE

## 2017-12-08 RX ORDER — IBUPROFEN 200 MG
200 TABLET ORAL EVERY 6 HOURS PRN
COMMUNITY

## 2017-12-08 RX ORDER — LIDOCAINE HYDROCHLORIDE 10 MG/ML
1 INJECTION, SOLUTION EPIDURAL; INFILTRATION; INTRACAUDAL; PERINEURAL ONCE
Status: COMPLETED | OUTPATIENT
Start: 2017-12-08 | End: 2017-12-08

## 2017-12-08 RX ORDER — PROPOFOL 10 MG/ML
INJECTION, EMULSION INTRAVENOUS CONTINUOUS PRN
Status: DISCONTINUED | OUTPATIENT
Start: 2017-12-08 | End: 2017-12-08 | Stop reason: SDUPTHER

## 2017-12-08 RX ORDER — MIDAZOLAM HYDROCHLORIDE 1 MG/ML
INJECTION INTRAMUSCULAR; INTRAVENOUS PRN
Status: DISCONTINUED | OUTPATIENT
Start: 2017-12-08 | End: 2017-12-08 | Stop reason: SDUPTHER

## 2017-12-08 RX ORDER — FENTANYL CITRATE 50 UG/ML
INJECTION, SOLUTION INTRAMUSCULAR; INTRAVENOUS PRN
Status: DISCONTINUED | OUTPATIENT
Start: 2017-12-08 | End: 2017-12-08 | Stop reason: SDUPTHER

## 2017-12-08 RX ORDER — SODIUM CHLORIDE, SODIUM LACTATE, POTASSIUM CHLORIDE, CALCIUM CHLORIDE 600; 310; 30; 20 MG/100ML; MG/100ML; MG/100ML; MG/100ML
INJECTION, SOLUTION INTRAVENOUS CONTINUOUS
Status: DISCONTINUED | OUTPATIENT
Start: 2017-12-08 | End: 2017-12-08 | Stop reason: HOSPADM

## 2017-12-08 RX ADMIN — FENTANYL CITRATE 50 MCG: 50 INJECTION, SOLUTION INTRAMUSCULAR; INTRAVENOUS at 10:16

## 2017-12-08 RX ADMIN — LIDOCAINE HYDROCHLORIDE 1 ML: 10 INJECTION, SOLUTION EPIDURAL; INFILTRATION; INTRACAUDAL; PERINEURAL at 09:38

## 2017-12-08 RX ADMIN — PROPOFOL 100 MCG/KG/MIN: 10 INJECTION, EMULSION INTRAVENOUS at 10:16

## 2017-12-08 RX ADMIN — PHENYLEPHRINE HYDROCHLORIDE 100 MCG: 10 INJECTION INTRAVENOUS at 10:39

## 2017-12-08 RX ADMIN — PHENYLEPHRINE HYDROCHLORIDE 100 MCG: 10 INJECTION INTRAVENOUS at 10:47

## 2017-12-08 RX ADMIN — SODIUM CHLORIDE, SODIUM LACTATE, POTASSIUM CHLORIDE, AND CALCIUM CHLORIDE: 600; 310; 30; 20 INJECTION, SOLUTION INTRAVENOUS at 09:37

## 2017-12-08 RX ADMIN — MIDAZOLAM HYDROCHLORIDE 2 MG: 1 INJECTION, SOLUTION INTRAMUSCULAR; INTRAVENOUS at 10:16

## 2017-12-08 RX ADMIN — FENTANYL CITRATE 50 MCG: 50 INJECTION, SOLUTION INTRAMUSCULAR; INTRAVENOUS at 10:22

## 2017-12-08 ASSESSMENT — PAIN - FUNCTIONAL ASSESSMENT: PAIN_FUNCTIONAL_ASSESSMENT: 0-10

## 2017-12-08 ASSESSMENT — LIFESTYLE VARIABLES: SMOKING_STATUS: 1

## 2017-12-08 NOTE — ANESTHESIA PRE PROCEDURE
Department of Anesthesiology  Preprocedure Note       Name:  Tiago Oden   Age:  67 y.o.  :  1945                                          MRN:  [id number]         Date:  2017      Surgeon: Claudia San):  Erick Brooks MD    Procedure: Procedure(s):  ENDOSCOPIC ULTRASOUND    Medications prior to admission:   Prior to Admission medications    Medication Sig Start Date End Date Taking?  Authorizing Provider   ibuprofen (ADVIL;MOTRIN) 200 MG tablet Take 200 mg by mouth every 6 hours as needed for Pain   Yes Historical Provider, MD   meloxicam (MOBIC) 15 MG tablet Take 15 mg by mouth daily    Historical Provider, MD   tamsulosin (FLOMAX) 0.4 MG capsule Take 0.4 mg by mouth 2 times daily    Historical Provider, MD   pantoprazole (PROTONIX) 40 MG tablet Take 40 mg by mouth daily    Historical Provider, MD   metoprolol succinate (TOPROL XL) 50 MG extended release tablet Take 50 mg by mouth daily    Historical Provider, MD   potassium chloride (KLOR-CON) 10 MEQ extended release tablet Take 10 mEq by mouth daily    Historical Provider, MD   clopidogrel (PLAVIX) 75 MG tablet Take 75 mg by mouth daily    Historical Provider, MD   aspirin 81 MG tablet Take 81 mg by mouth daily    Historical Provider, MD       Current medications:    Current Facility-Administered Medications   Medication Dose Route Frequency Provider Last Rate Last Dose    lactated ringers infusion   Intravenous Continuous Erick Brooks  mL/hr at 17 3802         Allergies:  No Known Allergies    Problem List:    Patient Active Problem List   Diagnosis Code    Pancreatitis K85.90    PVD (peripheral vascular disease) ([address] I73.9    Abdominal aortic aneurysm (AAA) [facility] I71.4    Smoker F17.200    Splenic vein thrombosis I82.890    Pancreatitis, unspecified pancreatitis type K85.90    Alcohol-induced acute pancreatitis K85.20       Past Medical History:        Diagnosis Date    Alcohol induced acute pancreatitis     Arthritis     Blood circulation, collateral     COPD (chronic obstructive pulmonary disease) (HCC)     GERD (gastroesophageal reflux disease)     current epigastric pain     Hypertension     PVD (peripheral vascular disease) ([facility]     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       Past Surgical History:        Procedure Laterality Date    BACK SURGERY N/A     VASCULAR SURGERY      stents lower extremities 2012/ [address] History:    Social History   Substance Use Topics    Smoking status: Current Every Day Smoker     Packs/day: 0.25     Years: 60.00     Types: Cigarettes    Smokeless tobacco: Never Used    Alcohol use 1.2 oz/week     2 Shots of liquor per week      Comment: nightly                                Ready to quit: Not Answered  Counseling given: Not Answered      Vital Signs (Current):   Vitals:    12/08/17 0917 12/08/17 0939 12/08/17 0942   BP:   113/74   Pulse:  77    Resp:  18    Temp: 97.4 °F (36.3 °C)     TempSrc: Temporal     SpO2:  94%    Weight:  164 lb 9.6 oz (74.7 kg)    Height:  5' 10\" (1.778 m)                                               BP Readings from Last 3 Encounters:   12/08/17 113/74   11/24/17 116/63       NPO Status: Time of last liquid consumption: 2230                        Time of last solid consumption: 1700                        Date of last liquid consumption: 12/07/17                        Date of last solid food consumption: 12/07/17    BMI:   Wt Readings from Last 3 Encounters:   12/08/17 164 lb 9.6 oz (74.7 kg)   11/24/17 161 lb 11.2 oz (73.3 kg)     Body mass index is 23.62 kg/m².     CBC:   Lab Results   Component Value Date    WBC 9.0 11/23/2017    RBC 3.89 11/23/2017    HGB 12.1 11/23/2017    HCT 35.8 11/23/2017    MCV 92.0 11/23/2017    RDW 15.9 11/23/2017     11/23/2017       CMP:   Lab Results   Component Value Date     11/24/2017    K 3.9 11/24/2017     11/24/2017    CO2 28 11/24/2017    BUN 5 11/24/2017    CREATININE 0.6 11/24/2017 LABGLOM >60 11/24/2017    GLUCOSE 93 11/24/2017    PROT 4.8 11/24/2017    CALCIUM 7.9 11/24/2017    BILITOT 0.7 11/24/2017    ALKPHOS 84 11/24/2017    AST 19 11/24/2017    ALT 11 11/24/2017       POC Tests: No results for input(s): POCGLU, POCNA, POCK, POCCL, POCBUN, POCHEMO, POCHCT in the last 72 hours. Coags:   Lab Results   Component Value Date    PROTIME 15.2 11/23/2017    INR 1.20 11/23/2017    APTT 34.9 11/23/2017       HCG (If Applicable): No results found for: PREGTESTUR, PREGSERUM, HCG, HCGQUANT     ABGs: No results found for: PHART, PO2ART, CDX8ENE, HMZ0PGC, BEART, R6MQZRAF     Type & Screen (If Applicable):  No results found for: LABABO, [address]    Anesthesia Evaluation  Patient summary reviewed and Nursing notes reviewed no history of anesthetic complications:   Airway: Mallampati: II  TM distance: >3 FB   Neck ROM: full  Mouth opening: < 3 FB Dental:    (+) upper dentures and lower dentures      Pulmonary:normal exam    (+) COPD:  current smoker          Patient smoked on day of surgery. ROS comment: Smoker 1ppd times 55 years   Cardiovascular:    (+) hypertension:,          Beta Blocker:  Not on Beta Blocker         Neuro/Psych:               GI/Hepatic/Renal:            ROS comment: H/o pancreatitis, ? Pancreatic mass  etoh abuse- none times 18 days per pt. Endo/Other:                     Abdominal:           Vascular:   + PVD, aortic or cerebral, . ROS comment: H/o blood clots. Anesthesia Plan      general and TIVA     ASA 3       Induction: intravenous. Anesthetic plan and risks discussed with patient.                       Iliana Shoemaker CRNA   12/8/2017

## 2017-12-08 NOTE — H&P
Patient Name: Yesenia Winn  : 1945  MRN: [id number]  DATE: 17    Allergies: No Known Allergies     ENDOSCOPY  History and Physical    Procedure:    [] Diagnostic Colonoscopy       [] Screening Colonoscopy  [x] EGD      [] ERCP      [x] EUS       [] Other    [x] Previous office notes/History and Physical reviewed from the patients chart. Please see EMR for further details of HPI. I have examined the patient's status immediately prior to the procedure and:      Indications/HPI:    Recent acute pancreatitis with questionable lesion noted on MRI/MRCP  History of ETOH use   No weight loss, normal CA 19-9    Anesthesia:   [x] MAC [] Moderate Sedation   [] General   [] None     ROS: 12 pt Review of Symptoms was negative unless mentioned above    Medications:   Prior to Admission medications    Medication Sig Start Date End Date Taking?  Authorizing Provider   ibuprofen (ADVIL;MOTRIN) 200 MG tablet Take 200 mg by mouth every 6 hours as needed for Pain   Yes Historical Provider, MD   meloxicam (MOBIC) 15 MG tablet Take 15 mg by mouth daily    Historical Provider, MD   tamsulosin (FLOMAX) 0.4 MG capsule Take 0.4 mg by mouth 2 times daily    Historical Provider, MD   pantoprazole (PROTONIX) 40 MG tablet Take 40 mg by mouth daily    Historical Provider, MD   metoprolol succinate (TOPROL XL) 50 MG extended release tablet Take 50 mg by mouth daily    Historical Provider, MD   potassium chloride (KLOR-CON) 10 MEQ extended release tablet Take 10 mEq by mouth daily    Historical Provider, MD   clopidogrel (PLAVIX) 75 MG tablet Take 75 mg by mouth daily    Historical Provider, MD   aspirin 81 MG tablet Take 81 mg by mouth daily    Historical Provider, MD       Past Medical History:  Past Medical History:   Diagnosis Date    Alcohol induced acute pancreatitis     Arthritis     Blood circulation, collateral     COPD (chronic obstructive pulmonary disease) (HCC)     GERD (gastroesophageal reflux disease)     current epigastric pain     Hypertension     PVD (peripheral vascular disease) ([facility] [address]     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       Past Surgical History:  Past Surgical History:   Procedure Laterality Date    BACK SURGERY N/A     VASCULAR SURGERY      stents lower extremities 2012/ [facility]       Social History:  Social History   Substance Use Topics    Smoking status: Current Every Day Smoker     Packs/day: 0.25     Years: 60.00     Types: Cigarettes    Smokeless tobacco: Never Used    Alcohol use 1.2 oz/week     2 Shots of liquor per week      Comment: nightly       Vital Signs:   Vitals:    12/08/17 0942   BP: 113/74   Pulse:    Resp:    Temp:    SpO2:         Physical Exam:  Cardiac:  [x]WNL  []Comments:  Pulmonary:  [x]WNL   []Comments:  Neuro/Mental Status:  [x]WNL  []Comments:  Abdominal:  [x]WNL    []Comments:  Other:   []WNL  []Comments:    Informed Consent:  The risks and benefits of the procedure have been discussed with either the patient or if they cannot consent, their representative. Assessment:  Patient examined and appropriate for planned sedation and procedure. Plan:  Proceed with planned sedation and procedure as above.     Quinten Ross MD

## 2017-12-08 NOTE — OP NOTE
[facility]                   [address] [facility]                                 OPERATIVE REPORT    PATIENT NAME: Lisa Aldrich                      :        1945  MED REC NO:   [id number]                              ROOM:  ACCOUNT NO:   [de-identified]                           ADMIT DATE: 2017  PROVIDER:     Faheem Rai MD          DATE OF PROCEDURE:  2017    PRIMARY CARE PHYSICIAN:  Sully Borrego MD    ATTENDING PHYSICIAN:  Faheem Rai MD    REFERRING PROVIDER:  Anuel Santana DO    INDICATION FOR PROCEDURE:  A 57-year-old male with a recent episode of  pancreatitis. Followup imaging here with MRI and MRCP did show a  questionable nodular area within the body of the pancreas measuring 4.7 x  3.6 cm. This is predominantly cystic in nature. Included acute  pancreatitis versus also pancreatic neoplasm or cystic lesion. There was  significant peripancreatic inflammation around the pancreas and the  inferior stomach. There was some mild dilation as well. I was asked to  evaluate for EUS and FNA. PROCEDURE PERFORMED:  EUS with fine needle aspiration. ANESTHESIA:  MAC.    COMPLICATIONS:  None. ESTIMATED BLOOD LOSS:  Minimal.    DESCRIPTION OF PROCEDUE:  I met with the patient prior to surgery. We  discussed risks, benefits and alternatives. He was brought to the  Endoscopy Unit and placed in the left lateral decubitus position. An  oblique viewing 140 series Olympus echoendoscope was advanced into the  oropharynx down to the distal duodenum. ENDOSCOPIC FINDINGS:  Limited views of the esophageal, gastric and duodenal  mucosa appeared normal.    ENDOSONOGRAPHIC FINDINGS:  1. The celiac access was identified. There was no concerning celiac  lymphadenopathy. 2.  Limited views of the left lobe of the liver showed some very mild  prominence of the left intrahepatic ducts. No focal hepatic lesion or mass  was seen.   No significant pathology results. 2.  I will recheck a  and check an IgG4 level today. 3.  The patient does need repeat imaging. I would recommend a CT scan of  the abdomen and pelvis pancreatic protocol with oral and IV contrast in  approximately 6 to 8 weeks. 4.  He also needs a followup office visit with me in approximately 8 weeks  (to be scheduled after his CT scan is completed).         Bethany Rodney MD    D: 12/08/2017 12:20:52       T: 12/08/2017 16:32:13     [email or web address]  Job#: [id number]     Doc#: [id number]    CC:

## 2017-12-10 LAB — IGG 4: 5 MG/DL (ref 1–123)

## 2017-12-11 ENCOUNTER — TELEPHONE (OUTPATIENT)
Dept: GASTROENTEROLOGY | Age: 72
End: 2017-12-11

## 2017-12-11 RX ORDER — CIPROFLOXACIN 500 MG/1
500 TABLET, FILM COATED ORAL 2 TIMES DAILY
Qty: 10 TABLET | Refills: 0 | Status: SHIPPED | OUTPATIENT
Start: 2017-12-11 | End: 2017-12-16

## 2017-12-11 NOTE — TELEPHONE ENCOUNTER
I received a phone call from Sonya Garza with [address] [facility] stating that she called patient today to follow up from his procedure on Friday. Patient's wife advised that he was not feeling well once he got home. He ate jello and went to bed. He had fever, chills, vomiting 2 or 3 times, and \"some diarrhea all night. \" Saturday he continued to be sick and stayed in bed all day. Again he ate jello and a popcycle. He ran a \"high fever\" with vomiting a few times with diarrhea. Sunday and today, he has felt better and has a \"low grade fever. \" He has not eaten anything to make him feel this way. He was instructed to not eat anything high in fat and he has not. I discussed this with Dr Adrien Garcia and we also looked at his pathology results from his procedure. I called patient's wife and asked how he was today. As previously stated, he is feeling better and has a low grade fever. She did ask for me to send in the medication Dr Adrien Garcia offered, Cipro 500 mg bid for 5 days. This was sent to [facility] [place] in [place], per her request. In regards to the fevers, she is not taking an actual temp, she is only going by touch. She was advised that pathology results were benign and he was probably having resolving pancreatitis. Patient will have CT and office visit with Dr Adrien Garcia. Carina, , will call patient with the information. Patient's wife voiced understanding.

## 2019-04-09 LAB
ALBUMIN SERPL-MCNC: 2.1 G/DL (ref 3.5–5.2)
ALP BLD-CCNC: 185 U/L (ref 40–130)
ALT SERPL-CCNC: 7 U/L (ref 5–41)
ANION GAP SERPL CALCULATED.3IONS-SCNC: 13 MMOL/L (ref 7–19)
AST SERPL-CCNC: 14 U/L (ref 5–40)
BASOPHILS ABSOLUTE: 0.1 K/UL (ref 0–0.2)
BASOPHILS RELATIVE PERCENT: 0.7 % (ref 0–1)
BILIRUB SERPL-MCNC: <0.2 MG/DL (ref 0.2–1.2)
BUN BLDV-MCNC: 14 MG/DL (ref 8–23)
CALCIUM SERPL-MCNC: 8.1 MG/DL (ref 8.8–10.2)
CHLORIDE BLD-SCNC: 100 MMOL/L (ref 98–111)
CO2: 22 MMOL/L (ref 22–29)
CREAT SERPL-MCNC: 1 MG/DL (ref 0.5–1.2)
EOSINOPHILS ABSOLUTE: 0.9 K/UL (ref 0–0.6)
EOSINOPHILS RELATIVE PERCENT: 9.9 % (ref 0–5)
GFR NON-AFRICAN AMERICAN: >60
GLUCOSE BLD-MCNC: 145 MG/DL (ref 74–109)
HCT VFR BLD CALC: 28.5 % (ref 42–52)
HEMOGLOBIN: 9.3 G/DL (ref 14–18)
LYMPHOCYTES ABSOLUTE: 1.2 K/UL (ref 1.1–4.5)
LYMPHOCYTES RELATIVE PERCENT: 12.6 % (ref 20–40)
MCH RBC QN AUTO: 30.3 PG (ref 27–31)
MCHC RBC AUTO-ENTMCNC: 32.6 G/DL (ref 33–37)
MCV RBC AUTO: 92.8 FL (ref 80–94)
MONOCYTES ABSOLUTE: 0.8 K/UL (ref 0–0.9)
MONOCYTES RELATIVE PERCENT: 8.8 % (ref 0–10)
NEUTROPHILS ABSOLUTE: 6.3 K/UL (ref 1.5–7.5)
NEUTROPHILS RELATIVE PERCENT: 67.6 % (ref 50–65)
PDW BLD-RTO: 17.8 % (ref 11.5–14.5)
PLATELET # BLD: 450 K/UL (ref 130–400)
PMV BLD AUTO: 9.1 FL (ref 9.4–12.4)
POTASSIUM SERPL-SCNC: 4.5 MMOL/L (ref 3.5–5)
RBC # BLD: 3.07 M/UL (ref 4.7–6.1)
SODIUM BLD-SCNC: 135 MMOL/L (ref 136–145)
TOTAL PROTEIN: 5.4 G/DL (ref 6.6–8.7)
WBC # BLD: 9.4 K/UL (ref 4.8–10.8)

## (undated) DEVICE — ENDO KIT,LOURDES HOSPITAL: Brand: MEDLINE INDUSTRIES, INC.

## (undated) DEVICE — ENDOSCOPIC ULTRASOUND ASPIRATION NEEDLE: Brand: EXPECT SLIMLINE SL